# Patient Record
Sex: FEMALE | Race: WHITE | NOT HISPANIC OR LATINO
[De-identification: names, ages, dates, MRNs, and addresses within clinical notes are randomized per-mention and may not be internally consistent; named-entity substitution may affect disease eponyms.]

---

## 2022-03-22 ENCOUNTER — NON-APPOINTMENT (OUTPATIENT)
Age: 62
End: 2022-03-22

## 2022-03-22 ENCOUNTER — APPOINTMENT (OUTPATIENT)
Dept: BREAST CENTER | Facility: CLINIC | Age: 62
End: 2022-03-22
Payer: COMMERCIAL

## 2022-03-22 VITALS
BODY MASS INDEX: 26.74 KG/M2 | SYSTOLIC BLOOD PRESSURE: 134 MMHG | WEIGHT: 149 LBS | HEART RATE: 74 BPM | HEIGHT: 62.5 IN | DIASTOLIC BLOOD PRESSURE: 81 MMHG

## 2022-03-22 DIAGNOSIS — Z78.9 OTHER SPECIFIED HEALTH STATUS: ICD-10-CM

## 2022-03-22 DIAGNOSIS — Z87.39 PERSONAL HISTORY OF OTHER DISEASES OF THE MUSCULOSKELETAL SYSTEM AND CONNECTIVE TISSUE: ICD-10-CM

## 2022-03-22 DIAGNOSIS — Z80.52 FAMILY HISTORY OF MALIGNANT NEOPLASM OF BLADDER: ICD-10-CM

## 2022-03-22 DIAGNOSIS — Z87.442 PERSONAL HISTORY OF URINARY CALCULI: ICD-10-CM

## 2022-03-22 DIAGNOSIS — Z82.0 FAMILY HISTORY OF EPILEPSY AND OTHER DISEASES OF THE NERVOUS SYSTEM: ICD-10-CM

## 2022-03-22 DIAGNOSIS — Z86.39 PERSONAL HISTORY OF OTHER ENDOCRINE, NUTRITIONAL AND METABOLIC DISEASE: ICD-10-CM

## 2022-03-22 DIAGNOSIS — F17.200 NICOTINE DEPENDENCE, UNSPECIFIED, UNCOMPLICATED: ICD-10-CM

## 2022-03-22 PROCEDURE — 99204 OFFICE O/P NEW MOD 45 MIN: CPT

## 2022-03-22 PROCEDURE — 99072 ADDL SUPL MATRL&STAF TM PHE: CPT

## 2022-03-22 RX ORDER — ROSUVASTATIN CALCIUM 5 MG/1
TABLET, FILM COATED ORAL
Refills: 0 | Status: ACTIVE | COMMUNITY

## 2022-03-22 NOTE — REVIEW OF SYSTEMS
[Lower Back Pain] : lower back pain [Sleep Disturbances] : sleep disturbances [Anxiety] : anxiety [Easy Bruising] : a tendency for easy bruising [Negative] : Heme/Lymph

## 2022-03-25 NOTE — HISTORY OF PRESENT ILLNESS
[FreeTextEntry1] : This is a 61 year old female referred by Dr. Roger for lymphoid atypia of the left breast. The patient underwent screening imaging on 1/3/80514 at [facility] which showed  a 0.6 cm partially circumscribed mass of the left UOQ with no sonographic correlative. A stereotactic biopsy was recommended and undertaken on 2/11/22. Pathology of the left UOQ (Cork-shaped clip) showed stromal fibrosis with a small focus of atypical lymphoid infiltrate. 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  A re-biopsy was recommended. This was done on 3/2/22. The pathology of the left UOQ rebiopsy (tophat-shaped clip) showed benign fibrocystic changes . No lymphoid infiltrate was seen. Surgical excision has been recommended for definitive diagnosis. \par \par The patient has no previous breast history. Denies any trauma, changes to herbs, diet or supplements.\par \par She does SBE. \par She has not noticed a change in her breast or a breast lump.\par She has not noticed a change in her nipple or nipple area.\par She has not noticed a change in the skin of the breast.\par She is not experiencing nipple discharge.\par She is not experiencing breast pain.\par She has not noticed a lump or lymph node under the armpit. \par \par BREAST CANCER RISK FACTORS\par Menarche: 15\par Menopause: 49\par Grav:  3    Para: 3\par Age at first live birth: 23\par Nursed: no\par Hysterectomy: no\par Oophorectomy:\par OCP: yes 1y\par HRT:  no\par Last pap/pelvic exam:  2016 WNL\par Related family history: no\par Ashkenazi: no\par Mastery risk assessment: BRCAPRO 8.5% Tcv7 6.6% TCv8 7.8% Isha 8.7%\par BRCA testing: no\par Bra size:  36B\par \par Last mammogram:  1/3/2022                            Location: [facility]\par Report reviewed.                                 Images reviewed.\par Results:Birads 4\par Heterogeneously dense\par Left UOQ 0.6 cm partially circumscribed mass. rec stereotactic biopsy\par \par Last ultrasound: 1/3/2022                                  Location: [facility]\par Report reviewed.                                 Images reviewed. \par Results: Birads 4\par No evidence of malignancy\par No left breast correlative to mammo findings\par \par Last MRI:  never                                           Location:\par Report reviewed.\par

## 2022-03-25 NOTE — CONSULT LETTER
[Dear  ___] : Dear  [unfilled], [( Thank you for referring [unfilled] for consultation for _____ )] : Thank you for referring [unfilled] for consultation for [unfilled] [Please see my note below.] : Please see my note below. [Consult Closing:] : Thank you very much for allowing me to participate in the care of this patient.  If you have any questions, please do not hesitate to contact me. [Sincerely,] : Sincerely, [FreeTextEntry3] : Laurie Alex MS DO\par Breast Surgeon\par [facility] \par [place] [id number]\par \par

## 2022-03-25 NOTE — PHYSICAL EXAM
[Normocephalic] : normocephalic [Atraumatic] : atraumatic [Supple] : supple [No Supraclavicular Adenopathy] : no supraclavicular adenopathy [Examined in the supine and seated position] : examined in the supine and seated position [Symmetrical] : symmetrical [No dominant masses] : no dominant masses in right breast  [No dominant masses] : no dominant masses left breast [No Nipple Retraction] : no left nipple retraction [No Nipple Discharge] : no left nipple discharge [No Axillary Lymphadenopathy] : no right axillary lymphadenopathy [No Edema] : no edema [No Rashes] : no rashes [No Ulceration] : no ulceration [de-identified] : possible LAD

## 2022-03-25 NOTE — ASSESSMENT
[FreeTextEntry1] : 60 yo female with lymphoepithelial lesion \par discussed options in management to include observation or excision\par discussed case with Dr. Chahal who will see the patient for hematological work up\par if needed I can perform excisional biopsy\par given her limited family history she is interested in genetic testing and referral was made\par \par We also reviewed the procedure of localization (magseed) and excision. We reviewed postop care and recovery. We reviewed the risks of infection, bleeding, hematoma, seroma, deformity, scarring and change of sensation particularly in the nipple with possible loss of sensation. I outlined the procedure and what she should expect on the day of surgery. She understands all of the above and her questions have been answered.  \par \par She will see Dr. Chahal asa and go from there.\par She knows to call or return sooner should any concerns or questions arise.\par \par

## 2022-04-05 ENCOUNTER — TRANSCRIPTION ENCOUNTER (OUTPATIENT)
Age: 62
End: 2022-04-05

## 2022-04-28 ENCOUNTER — NON-APPOINTMENT (OUTPATIENT)
Age: 62
End: 2022-04-28

## 2022-04-28 ENCOUNTER — APPOINTMENT (OUTPATIENT)
Dept: BREAST CENTER | Facility: HOSPITAL | Age: 62
End: 2022-04-28
Payer: COMMERCIAL

## 2022-04-28 PROCEDURE — 99446 NTRPROF PH1/NTRNET/EHR 5-10: CPT

## 2022-05-02 ENCOUNTER — APPOINTMENT (OUTPATIENT)
Dept: BREAST CENTER | Facility: HOSPITAL | Age: 62
End: 2022-05-02
Payer: COMMERCIAL

## 2022-05-02 PROCEDURE — 76098 X-RAY EXAM SURGICAL SPECIMEN: CPT | Mod: 26

## 2022-05-02 PROCEDURE — 19125 EXCISION BREAST LESION: CPT | Mod: RT,59

## 2022-05-02 PROCEDURE — 14301 TIS TRNFR ANY 30.1-60 SQ CM: CPT

## 2022-05-11 ENCOUNTER — APPOINTMENT (OUTPATIENT)
Dept: BREAST CENTER | Facility: CLINIC | Age: 62
End: 2022-05-11
Payer: COMMERCIAL

## 2022-05-11 DIAGNOSIS — D48.62 NEOPLASM OF UNCERTAIN BEHAVIOR OF LEFT BREAST: ICD-10-CM

## 2022-05-11 PROCEDURE — 99024 POSTOP FOLLOW-UP VISIT: CPT

## 2022-05-11 NOTE — HISTORY OF PRESENT ILLNESS
[FreeTextEntry1] : This is a 62 year old female referred by Dr. Roger for lymphoid atypia of the left breast. The patient underwent screening imaging on 1/3/[address]2214 at [facility] which showed  a 0.6 cm partially circumscribed mass of the left UOQ with no sonographic correlative. A stereotactic biopsy was recommended and undertaken on 2/11/22. Pathology of the left UOQ (Cork-shaped clip) showed stromal fibrosis with a small focus of atypical lymphoid infiltrate. 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  A re-biopsy was recommended. This was done on 3/2/22. The pathology of the left UOQ rebiopsy (tophat-shaped clip) showed benign fibrocystic changes . No lymphoid infiltrate was seen. Surgical excision has been recommended for definitive diagnosis. \par \par She is s/p Left EBBX UOQ on 5/2/22, pathology showed no atypical lymphoid infiltrate, background fat involution and focal stromal fibrosis.  Doing well postop.\par \par \par She does SBE. \par She has not noticed a change in her breast or a breast lump.\par She has not noticed a change in her nipple or nipple area.\par She has not noticed a change in the skin of the breast.\par She is not experiencing nipple discharge.\par She is not experiencing breast pain.\par She has not noticed a lump or lymph node under the armpit. \par \par BREAST CANCER RISK FACTORS\par Menarche: 15\par Menopause: 49\par Grav:  3    Para: 3\par Age at first live birth: 23\par Nursed: no\par Hysterectomy: no\par Oophorectomy:\par OCP: yes 1y\par HRT:  no\par Last pap/pelvic exam:  2016 WNL\par Related family history: no\par Ashkenazi: no\par Mastery risk assessment: BRCAPRO 8.5% Tcv7 6.6% TCv8 7.8% Isha 8.7%\par BRCA testing: no\par Bra size:  36B\par \par Last mammogram:  1/3/2022                            Location: [facility]\par Report reviewed.                                 Images reviewed.\par Results:Birads 4\par Heterogeneously dense\par Left UOQ 0.6 cm partially circumscribed mass. rec stereotactic biopsy\par \par Last ultrasound: 1/3/2022                                  Location: [facility]\par Report reviewed.                                 Images reviewed. \par Results: Birads 4\par No evidence of malignancy\par No left breast correlative to mammo findings\par \par Last MRI:  never                                           Location:\par Report reviewed.\par

## 2022-08-02 ENCOUNTER — APPOINTMENT (OUTPATIENT)
Dept: BREAST CENTER | Facility: CLINIC | Age: 62
End: 2022-08-02